# Patient Record
Sex: MALE | Race: ASIAN | NOT HISPANIC OR LATINO | URBAN - METROPOLITAN AREA
[De-identification: names, ages, dates, MRNs, and addresses within clinical notes are randomized per-mention and may not be internally consistent; named-entity substitution may affect disease eponyms.]

---

## 2017-10-11 ENCOUNTER — EMERGENCY (EMERGENCY)
Facility: HOSPITAL | Age: 37
LOS: 1 days | Discharge: LEFT BEFORE DISCHARGE | End: 2017-10-11
Admitting: EMERGENCY MEDICINE

## 2017-10-11 VITALS
SYSTOLIC BLOOD PRESSURE: 158 MMHG | TEMPERATURE: 98 F | DIASTOLIC BLOOD PRESSURE: 114 MMHG | OXYGEN SATURATION: 100 % | HEART RATE: 96 BPM | RESPIRATION RATE: 14 BRPM

## 2017-10-11 NOTE — ED ADULT TRIAGE NOTE - CHIEF COMPLAINT QUOTE
Pt c/o High BP last 5 days today 230/130s, yesterday had a Headache.  Reports having difficulty sleeping last few weeks, and reports stuffy nose r/t allergies last few days.  Pt reports having reflux when lays down to go to sleep.  BP currently 158/114. Denies CP or HA currently.  PMHx HTN on Lisinopril 20mg, states dose was increased ~2mo ago.  No distress noted. Pt c/o High BP last 5 days today 230/130s, yesterday had a Headache.  Reports having difficulty sleeping last few weeks 2/2 work stress, and reports stuffy nose r/t allergies last few days.  BP currently 158/114. Denies CP or HA currently.  PMHx HTN on Lisinopril 20mg, states dose was increased ~2mo ago.  No distress noted.

## 2017-10-11 NOTE — ED ADULT NURSE NOTE - CHIEF COMPLAINT QUOTE
Pt c/o High BP last 5 days today 230/130s, yesterday had a Headache.  Reports having difficulty sleeping last few weeks 2/2 work stress, and reports stuffy nose r/t allergies last few days.  BP currently 158/114. Denies CP or HA currently.  PMHx HTN on Lisinopril 20mg, states dose was increased ~2mo ago.  No distress noted.